# Patient Record
Sex: FEMALE | URBAN - METROPOLITAN AREA
[De-identification: names, ages, dates, MRNs, and addresses within clinical notes are randomized per-mention and may not be internally consistent; named-entity substitution may affect disease eponyms.]

---

## 2024-01-25 ENCOUNTER — OFFICE VISIT (OUTPATIENT)
Age: 69
End: 2024-01-25
Payer: MEDICARE

## 2024-01-25 VITALS
OXYGEN SATURATION: 97 % | SYSTOLIC BLOOD PRESSURE: 180 MMHG | HEIGHT: 62 IN | HEART RATE: 78 BPM | DIASTOLIC BLOOD PRESSURE: 98 MMHG | BODY MASS INDEX: 26.5 KG/M2 | WEIGHT: 144 LBS

## 2024-01-25 DIAGNOSIS — I10 PRIMARY HYPERTENSION: ICD-10-CM

## 2024-01-25 DIAGNOSIS — R07.9 CHEST PAIN, UNSPECIFIED TYPE: ICD-10-CM

## 2024-01-25 DIAGNOSIS — I49.3 PVC (PREMATURE VENTRICULAR CONTRACTION): Primary | ICD-10-CM

## 2024-01-25 PROCEDURE — 99204 OFFICE O/P NEW MOD 45 MIN: CPT | Performed by: INTERNAL MEDICINE

## 2024-01-25 PROCEDURE — 93000 ELECTROCARDIOGRAM COMPLETE: CPT | Performed by: INTERNAL MEDICINE

## 2024-01-25 PROCEDURE — 1123F ACP DISCUSS/DSCN MKR DOCD: CPT | Performed by: INTERNAL MEDICINE

## 2024-01-25 PROCEDURE — 3077F SYST BP >= 140 MM HG: CPT | Performed by: INTERNAL MEDICINE

## 2024-01-25 PROCEDURE — 3080F DIAST BP >= 90 MM HG: CPT | Performed by: INTERNAL MEDICINE

## 2024-01-25 RX ORDER — ROSUVASTATIN CALCIUM 5 MG/1
5 TABLET, COATED ORAL DAILY
COMMUNITY
Start: 2023-12-18

## 2024-01-25 RX ORDER — TELMISARTAN 20 MG/1
30 TABLET ORAL DAILY
COMMUNITY
Start: 2024-01-02

## 2024-01-25 NOTE — PROGRESS NOTES
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  She has an Apple watch reviewing occasional ventricular bigeminy, heart rate will get down to 30s.  She will get some chest pain and pressure with this and some shortness of breath and dizziness, but no loss of consciousness.  She was diagnosed with PVCs many years ago and had seen Dr. Durand as well a number of years ago with an echocardiogram that was unremarkable and a Holter monitor.  She was given beta blocker.  Her blood pressure has been high, so she was taken off the beta blocker and she has actually taken Micardis for about a year and a half.  She has also taken Crestor for two years.  No recent stress test.      Impression:    Intermittent chest pressure, usually related to PVCs, but no recent stress test.   Hypertension, quite increased today with likely essential hypertension and superimposed white coat syndrome.  She is on Micardis, now currently 30 mg daily.    Dyslipidemia on statin.      Plan:  She has cut out alcohol, as well as stimulants, including caffeine and she seems to be feeling a little better, but still is having the palpitations.  I am going to obtain an exercise nuclear stress test, both to rule out any ischemia given her chest pain, as well as evaluate for any exercise induced arrhythmias.  I am going to obtain an echocardiogram and have her check a blood pressure log book for place and day and have her wear an event monitor for a week.  I am going to try to obtain the EKG from Ms. Mckee's office so I can determine the origin.  I briefly discussed antiarrhythmic treatment, as well as control of her blood pressure and even potential ablation, but for now I am going to obtain all of her results and see her back with further recommendations to follow.          Wt Readings from Last 3 Encounters:   01/25/24 65.3 kg (144 lb)     No past medical history on file.    Current

## 2024-03-27 ENCOUNTER — OFFICE VISIT (OUTPATIENT)
Age: 69
End: 2024-03-27
Payer: MEDICARE

## 2024-03-27 VITALS
OXYGEN SATURATION: 99 % | HEART RATE: 69 BPM | BODY MASS INDEX: 25.97 KG/M2 | DIASTOLIC BLOOD PRESSURE: 76 MMHG | SYSTOLIC BLOOD PRESSURE: 122 MMHG | WEIGHT: 142 LBS

## 2024-03-27 DIAGNOSIS — E78.5 DYSLIPIDEMIA: ICD-10-CM

## 2024-03-27 DIAGNOSIS — I10 PRIMARY HYPERTENSION: ICD-10-CM

## 2024-03-27 DIAGNOSIS — I49.3 PVC (PREMATURE VENTRICULAR CONTRACTION): Primary | ICD-10-CM

## 2024-03-27 PROCEDURE — 1123F ACP DISCUSS/DSCN MKR DOCD: CPT | Performed by: INTERNAL MEDICINE

## 2024-03-27 PROCEDURE — 3078F DIAST BP <80 MM HG: CPT | Performed by: INTERNAL MEDICINE

## 2024-03-27 PROCEDURE — 3074F SYST BP LT 130 MM HG: CPT | Performed by: INTERNAL MEDICINE

## 2024-03-27 PROCEDURE — 99214 OFFICE O/P EST MOD 30 MIN: CPT | Performed by: INTERNAL MEDICINE

## 2024-03-27 RX ORDER — TELMISARTAN 40 MG/1
40 TABLET ORAL DAILY
Qty: 30 TABLET | Refills: 3 | Status: SHIPPED | OUTPATIENT
Start: 2024-03-27

## 2024-03-27 NOTE — PROGRESS NOTES
History of Present Illness:  68 year-old female here for followup.  She works in cardiac anesthesiology, nurse anesthetist for many years.  She has a history of bigeminy, initially with palpitations, first starting back in 2009.  Heart rate would go down to 30s, but it was a ventricular bigeminy.  She was treated initially with Metoprolol without improvement in symptoms.  She has been under a fair amount of stimulant use and has really cut down on coffee.  She seems to be doing a little better.  She has two to three episodes a month.  Her home blood pressure is still a little high, but her Micardis is now at 40 mg daily.  She had a stress test and echocardiogram and she is here to discuss results.      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    Recent stress test and echocardiogram without obvious ischemia and low risk and normal EF.    Hypertension.   Dyslipidemia on statin.      Plan:  She remains on Micardis and we will do 40 mg daily for hypertension.  I talked about treatment options for her PVCs with ongoing lifestyle changes, avoiding stimulants, as well as potential ablation or calcium channel blocker.  She did not do well with the Metoprolol, it was not helpful.      I am going to give her a prescription for Diltiazem 30 mg to take for palpitations and again Micardis at 40 mg daily.  I will see her back in six months.          Wt Readings from Last 3 Encounters:   03/27/24 64.4 kg (142 lb)   03/18/24 65.3 kg (144 lb)   03/18/24 65.3 kg (144 lb)     No past medical history on file.    Current Outpatient Medications   Medication Sig Dispense Refill    rosuvastatin (CRESTOR) 5 MG tablet Take 1 tablet by mouth daily      telmisartan (MICARDIS) 20 MG tablet Take 1.5 tablets by mouth daily       No current facility-administered medications for this visit.       Social History   reports that she has never smoked. She has never used

## 2024-07-08 RX ORDER — TELMISARTAN 40 MG/1
40 TABLET ORAL DAILY
Qty: 90 TABLET | Refills: 3 | Status: SHIPPED | OUTPATIENT
Start: 2024-07-08

## 2024-10-02 ENCOUNTER — OFFICE VISIT (OUTPATIENT)
Age: 69
End: 2024-10-02
Payer: MEDICARE

## 2024-10-02 VITALS
OXYGEN SATURATION: 98 % | BODY MASS INDEX: 26.13 KG/M2 | DIASTOLIC BLOOD PRESSURE: 90 MMHG | HEIGHT: 62 IN | SYSTOLIC BLOOD PRESSURE: 148 MMHG | HEART RATE: 81 BPM | WEIGHT: 142 LBS

## 2024-10-02 DIAGNOSIS — I10 PRIMARY HYPERTENSION: ICD-10-CM

## 2024-10-02 DIAGNOSIS — E78.5 DYSLIPIDEMIA: ICD-10-CM

## 2024-10-02 DIAGNOSIS — I49.3 PVC (PREMATURE VENTRICULAR CONTRACTION): Primary | ICD-10-CM

## 2024-10-02 PROCEDURE — 3080F DIAST BP >= 90 MM HG: CPT | Performed by: INTERNAL MEDICINE

## 2024-10-02 PROCEDURE — 1123F ACP DISCUSS/DSCN MKR DOCD: CPT | Performed by: INTERNAL MEDICINE

## 2024-10-02 PROCEDURE — 99214 OFFICE O/P EST MOD 30 MIN: CPT | Performed by: INTERNAL MEDICINE

## 2024-10-02 PROCEDURE — 3077F SYST BP >= 140 MM HG: CPT | Performed by: INTERNAL MEDICINE

## 2024-10-02 RX ORDER — DILTIAZEM HYDROCHLORIDE 30 MG/1
TABLET, FILM COATED ORAL
Qty: 368 TABLET | Refills: 5 | Status: SHIPPED | OUTPATIENT
Start: 2024-10-02

## 2024-10-02 RX ORDER — ROSUVASTATIN CALCIUM 5 MG/1
5 TABLET, COATED ORAL DAILY
Qty: 90 TABLET | Refills: 3 | Status: SHIPPED | OUTPATIENT
Start: 2024-10-02

## 2024-10-02 RX ORDER — DILTIAZEM HYDROCHLORIDE 30 MG/1
30 TABLET, FILM COATED ORAL DAILY PRN
Qty: 90 TABLET | Refills: 3 | Status: SHIPPED | OUTPATIENT
Start: 2024-10-02 | End: 2024-10-02

## 2024-10-02 RX ORDER — DILTIAZEM HYDROCHLORIDE 120 MG/1
120 CAPSULE, COATED, EXTENDED RELEASE ORAL DAILY
Qty: 90 CAPSULE | Refills: 2 | Status: SHIPPED | OUTPATIENT
Start: 2024-10-02

## 2024-10-02 RX ORDER — TELMISARTAN 40 MG/1
40 TABLET ORAL DAILY
Qty: 90 TABLET | Refills: 3 | Status: SHIPPED | OUTPATIENT
Start: 2024-10-02

## 2024-10-02 NOTE — PROGRESS NOTES
History of Present Illness:  68 year-old female here for followup.  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  She has a history of palpitations, bigeminy dating back many years.  She has taken Metoprolol without improvement and we started Diltiazem short acting 30 mg take as needed, which seems to help.  She is under ongoing stress.  Blood pressure also creeps up.  No new chest pain, syncope, PND, orthopnea or edema.      Impression:   Recent increase in PVCs, RV outflow tract with inferior axis and left bundle branch morphology.  Treated now with short acting Diltiazem as needed, but increasing stressors.    History of stress test and echocardiogram without obvious ischemia earlier this year and normal EF.    Hypertension.    Dyslipidemia on statin.      Plan:  We talked about options and proceeding to ablation versus scheduling Cardizem long acting and this would help with her blood pressure and we elected to proceed with this.  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  If the symptoms progress despite the long acting Cardizem, we would move to ablation and she will let me know otherwise.  I will see her back in six months.        Wt Readings from Last 3 Encounters:   10/02/24 64.4 kg (142 lb)   03/27/24 64.4 kg (142 lb)   03/18/24 65.3 kg (144 lb)     No past medical history on file.    Current Outpatient Medications   Medication Sig Dispense Refill    telmisartan (MICARDIS) 40 MG tablet TAKE 1 TABLET BY MOUTH DAILY 90 tablet 3    dilTIAZem (CARDIZEM) 30 MG tablet TAKE 1 TABLET BY MOUTH EVERY 6 HOURS AS NEEDED FOR PALPITATIONS 30 tablet 3    rosuvastatin (CRESTOR) 5 MG tablet Take 1 tablet by mouth daily       No current facility-administered medications for this visit.       Social History   reports that she has never smoked. She has never used smokeless tobacco.   reports current alcohol use.    Family History  family history

## 2025-05-08 ENCOUNTER — OFFICE VISIT (OUTPATIENT)
Age: 70
End: 2025-05-08
Payer: MEDICARE

## 2025-05-08 VITALS
BODY MASS INDEX: 24.84 KG/M2 | SYSTOLIC BLOOD PRESSURE: 132 MMHG | WEIGHT: 135 LBS | HEIGHT: 62 IN | DIASTOLIC BLOOD PRESSURE: 74 MMHG | HEART RATE: 65 BPM | OXYGEN SATURATION: 98 %

## 2025-05-08 DIAGNOSIS — E78.5 DYSLIPIDEMIA: ICD-10-CM

## 2025-05-08 DIAGNOSIS — I10 PRIMARY HYPERTENSION: ICD-10-CM

## 2025-05-08 DIAGNOSIS — I49.3 PVC (PREMATURE VENTRICULAR CONTRACTION): Primary | ICD-10-CM

## 2025-05-08 PROCEDURE — 3078F DIAST BP <80 MM HG: CPT | Performed by: INTERNAL MEDICINE

## 2025-05-08 PROCEDURE — G8400 PT W/DXA NO RESULTS DOC: HCPCS | Performed by: INTERNAL MEDICINE

## 2025-05-08 PROCEDURE — 3075F SYST BP GE 130 - 139MM HG: CPT | Performed by: INTERNAL MEDICINE

## 2025-05-08 PROCEDURE — 99214 OFFICE O/P EST MOD 30 MIN: CPT | Performed by: INTERNAL MEDICINE

## 2025-05-08 PROCEDURE — 3017F COLORECTAL CA SCREEN DOC REV: CPT | Performed by: INTERNAL MEDICINE

## 2025-05-08 PROCEDURE — G8420 CALC BMI NORM PARAMETERS: HCPCS | Performed by: INTERNAL MEDICINE

## 2025-05-08 PROCEDURE — 1036F TOBACCO NON-USER: CPT | Performed by: INTERNAL MEDICINE

## 2025-05-08 PROCEDURE — 1123F ACP DISCUSS/DSCN MKR DOCD: CPT | Performed by: INTERNAL MEDICINE

## 2025-05-08 PROCEDURE — 1159F MED LIST DOCD IN RCRD: CPT | Performed by: INTERNAL MEDICINE

## 2025-05-08 PROCEDURE — 1090F PRES/ABSN URINE INCON ASSESS: CPT | Performed by: INTERNAL MEDICINE

## 2025-05-08 PROCEDURE — G8427 DOCREV CUR MEDS BY ELIG CLIN: HCPCS | Performed by: INTERNAL MEDICINE

## 2025-05-08 NOTE — PROGRESS NOTES
HPI:  69-year-old female here for followup. 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 She has some occasional palpitations but since switching to long-acting Cardizem and then using short-acting at times for increased palpitations, she seems to be doing relatively well and her symptoms are tolerable. No syncope, chest pain, dyspnea, PND, orthopnea or edema. She was recently diagnosed with a melanoma on the top of her scalp and she is scheduled for surgery next week.    Impression:   History of PVC's, RV outflow tract with inferior axis left bundle branch morphology, treated now with long-acting Cardizem and then short-acting Diltiazem as needed in the setting of increasing stressors and episodes.  History of stress test and echocardiogram last year without ischemia and normal EF.   Hypertension, well-controlled and Telmisartan.  Dyslipidemia, on statin.   Recent diagnosis of melanoma, planning surgery next week.    Plan:  Okay to proceed from my standpoint for surgery. She has had a stress test and echocardiogram within the past couple of years, unremarkable, and she has known RV outflow tract PVC's, reasonably well-controlled on calcium channel blocker. Blood pressure is well-controlled on Telmisartan and she is on a statin for dyslipidemia. I will see her back in 6 months.       Wt Readings from Last 3 Encounters:   05/08/25 61.2 kg (135 lb)   10/02/24 64.4 kg (142 lb)   03/27/24 64.4 kg (142 lb)     No past medical history on file.    Current Outpatient Medications   Medication Sig Dispense Refill    rosuvastatin (CRESTOR) 5 MG tablet Take 1 tablet by mouth daily 90 tablet 3    telmisartan (MICARDIS) 40 MG tablet Take 1 tablet by mouth daily 90 tablet 3    dilTIAZem (CARDIZEM CD) 120 MG extended release capsule Take 1 capsule by mouth daily 90 capsule 2    dilTIAZem (CARDIZEM) 30 MG tablet TAKE 1 TABLET BY MOUTH DAILY EVERY 6 HOURS AS NEEDED FOR PALPITATIONS 368 tablet 5     No current

## 2025-07-03 DIAGNOSIS — I10 PRIMARY HYPERTENSION: Primary | ICD-10-CM

## 2025-07-03 DIAGNOSIS — I49.3 PVC (PREMATURE VENTRICULAR CONTRACTION): ICD-10-CM

## 2025-07-03 RX ORDER — DILTIAZEM HYDROCHLORIDE 120 MG/1
120 CAPSULE, COATED, EXTENDED RELEASE ORAL DAILY
Qty: 90 CAPSULE | Refills: 2 | Status: SHIPPED | OUTPATIENT
Start: 2025-07-03